# Patient Record
Sex: FEMALE | Race: WHITE | NOT HISPANIC OR LATINO | Employment: UNEMPLOYED | URBAN - NONMETROPOLITAN AREA
[De-identification: names, ages, dates, MRNs, and addresses within clinical notes are randomized per-mention and may not be internally consistent; named-entity substitution may affect disease eponyms.]

---

## 2017-11-22 ENCOUNTER — NON-PROVIDER VISIT (OUTPATIENT)
Dept: URGENT CARE | Facility: PHYSICIAN GROUP | Age: 47
End: 2017-11-22

## 2017-11-22 DIAGNOSIS — Z02.1 PRE-EMPLOYMENT DRUG SCREENING: ICD-10-CM

## 2017-11-22 LAB
AMP AMPHETAMINE: NORMAL
COC COCAINE: NORMAL
INT CON NEG: NEGATIVE
INT CON POS: POSITIVE
MET METHAMPHETAMINES: NORMAL
OPI OPIATES: NORMAL
PCP PHENCYCLIDINE: NORMAL
POC DRUG COMMENT 753798-OCCUPATIONAL HEALTH: NEGATIVE
THC: NORMAL

## 2017-11-22 PROCEDURE — 80305 DRUG TEST PRSMV DIR OPT OBS: CPT | Performed by: PHYSICIAN ASSISTANT

## 2018-01-14 ENCOUNTER — OFFICE VISIT (OUTPATIENT)
Dept: URGENT CARE | Facility: PHYSICIAN GROUP | Age: 48
End: 2018-01-14
Payer: MEDICAID

## 2018-01-14 VITALS
BODY MASS INDEX: 36.43 KG/M2 | HEIGHT: 69 IN | OXYGEN SATURATION: 95 % | HEART RATE: 104 BPM | SYSTOLIC BLOOD PRESSURE: 124 MMHG | RESPIRATION RATE: 16 BRPM | TEMPERATURE: 97.6 F | WEIGHT: 246 LBS | DIASTOLIC BLOOD PRESSURE: 80 MMHG

## 2018-01-14 DIAGNOSIS — M25.512 LEFT SHOULDER PAIN, UNSPECIFIED CHRONICITY: ICD-10-CM

## 2018-01-14 PROCEDURE — 99203 OFFICE O/P NEW LOW 30 MIN: CPT | Performed by: PHYSICIAN ASSISTANT

## 2018-01-14 RX ORDER — IBUPROFEN 800 MG/1
800 TABLET ORAL EVERY 8 HOURS PRN
Qty: 30 TAB | Refills: 0 | Status: SHIPPED | OUTPATIENT
Start: 2018-01-14 | End: 2018-05-10 | Stop reason: SDUPTHER

## 2018-01-14 NOTE — PROGRESS NOTES
"Chief Complaint   Patient presents with   • Shoulder Pain       HISTORY OF PRESENT ILLNESS: Patient is a 47 y.o. female who presents today for the following:    Left shoulder pain x 2 months  Worsening pain and range of motion  + mild hand pain x 2-3 days  Denies swelling, distal paresthesias, previous injury  OTC meds tried: APAP 1000mg every 6 hours, not helping  Xray some time in the last 2 months - \"frozen shoulder\"      There are no active problems to display for this patient.      Allergies:Darvon [propoxyphene] and Pcn [penicillins]    Current Outpatient Prescriptions Ordered in [facility]   Medication Sig Dispense Refill   • ibuprofen (MOTRIN) 800 MG Tab Take 1 Tab by mouth every 8 hours as needed for Moderate Pain. 30 Tab 0     No current Epic-ordered facility-administered medications on file.        No past medical history on file.    Social History   Substance Use Topics   • Smoking status: Never Smoker   • Smokeless tobacco: Never Used   • Alcohol use Not on file       No family status information on file.   No family history on file.    ROS:   Review of Systems   Constitutional: Negative for fever, chills, weight loss and malaise/fatigue.   HENT: Negative for ear pain, nosebleeds, congestion, sore throat and neck pain.    Eyes: Negative for blurred vision.   Respiratory: Negative for cough, sputum production, shortness of breath and wheezing.    Cardiovascular: Negative for chest pain, palpitations, orthopnea and leg swelling.   Gastrointestinal: Negative for heartburn, nausea, vomiting and abdominal pain.   Genitourinary: Negative for dysuria, urgency and frequency.       Exam:  Blood pressure 124/80, pulse (!) 104, temperature 36.4 °C (97.6 °F), resp. rate 16, height 1.753 m (5' 9\"), weight 111.6 kg (246 lb), SpO2 95 %.  General: Well developed, well nourished. No distress.  HEENT: Head is grossly normal.  Pulmonary: No respiratory distress noted.  Cardiovascular: Radial pulses are strong and equal " bilaterally.  Extremities: Significant decreased range of motion in all planes of the left shoulder. No soft tissue swelling, erythema, or rashes noted in the area of pain. Generalized tenderness noted of the distal clavicle and left shoulder. No other abnormalities noted on the left arm or hand.  strength of the left hand is slightly weaker than the right.  Psych: Normal mood. Alert and oriented x3. Judgment and insight is normal.    Assessment/Plan:  Recommended adding ibuprofen to the acetaminophen for additional pain relief. Discussed using over-the-counter muscle resident and heat. Establish and follow up with her primary care provider. Referred to physical therapy.   1. Left shoulder pain, unspecified chronicity  REFERRAL TO PHYSICAL THERAPY Reason for Therapy: Eval/Treat/Report    ibuprofen (MOTRIN) 800 MG Tab

## 2018-01-24 ENCOUNTER — HOSPITAL ENCOUNTER (OUTPATIENT)
Facility: MEDICAL CENTER | Age: 48
End: 2018-01-24
Attending: PHYSICIAN ASSISTANT
Payer: MEDICAID

## 2018-01-24 ENCOUNTER — OFFICE VISIT (OUTPATIENT)
Dept: URGENT CARE | Facility: PHYSICIAN GROUP | Age: 48
End: 2018-01-24
Payer: MEDICAID

## 2018-01-24 VITALS
RESPIRATION RATE: 16 BRPM | TEMPERATURE: 98.2 F | HEIGHT: 68 IN | BODY MASS INDEX: 37.44 KG/M2 | WEIGHT: 247 LBS | OXYGEN SATURATION: 98 % | SYSTOLIC BLOOD PRESSURE: 128 MMHG | HEART RATE: 78 BPM | DIASTOLIC BLOOD PRESSURE: 84 MMHG

## 2018-01-24 DIAGNOSIS — J31.0 OTHER RHINITIS, UNSPECIFIED CHRONICITY: ICD-10-CM

## 2018-01-24 DIAGNOSIS — R82.90 ABNORMAL URINALYSIS: ICD-10-CM

## 2018-01-24 DIAGNOSIS — M25.512 ACUTE PAIN OF LEFT SHOULDER: ICD-10-CM

## 2018-01-24 DIAGNOSIS — N30.01 ACUTE CYSTITIS WITH HEMATURIA: ICD-10-CM

## 2018-01-24 DIAGNOSIS — H65.93 FLUID LEVEL BEHIND TYMPANIC MEMBRANE OF BOTH EARS: ICD-10-CM

## 2018-01-24 DIAGNOSIS — R39.15 URINARY URGENCY: ICD-10-CM

## 2018-01-24 DIAGNOSIS — R42 VERTIGO: ICD-10-CM

## 2018-01-24 DIAGNOSIS — E66.9 OBESITY (BMI 35.0-39.9 WITHOUT COMORBIDITY): ICD-10-CM

## 2018-01-24 DIAGNOSIS — R35.0 URINARY FREQUENCY: ICD-10-CM

## 2018-01-24 LAB
APPEARANCE UR: NORMAL
BILIRUB UR STRIP-MCNC: NORMAL MG/DL
COLOR UR AUTO: YELLOW
GLUCOSE BLD-MCNC: 101 MG/DL (ref 70–100)
GLUCOSE UR STRIP.AUTO-MCNC: NORMAL MG/DL
KETONES UR STRIP.AUTO-MCNC: NORMAL MG/DL
LEUKOCYTE ESTERASE UR QL STRIP.AUTO: NORMAL
NITRITE UR QL STRIP.AUTO: NORMAL
PH UR STRIP.AUTO: 5 [PH] (ref 5–8)
PROT UR QL STRIP: NORMAL MG/DL
RBC UR QL AUTO: NORMAL
SP GR UR STRIP.AUTO: 1.02
UROBILINOGEN UR STRIP-MCNC: NORMAL MG/DL

## 2018-01-24 PROCEDURE — 81002 URINALYSIS NONAUTO W/O SCOPE: CPT | Performed by: PHYSICIAN ASSISTANT

## 2018-01-24 PROCEDURE — 87077 CULTURE AEROBIC IDENTIFY: CPT

## 2018-01-24 PROCEDURE — 87086 URINE CULTURE/COLONY COUNT: CPT

## 2018-01-24 PROCEDURE — 82962 GLUCOSE BLOOD TEST: CPT | Performed by: PHYSICIAN ASSISTANT

## 2018-01-24 PROCEDURE — 99214 OFFICE O/P EST MOD 30 MIN: CPT | Mod: 25 | Performed by: PHYSICIAN ASSISTANT

## 2018-01-24 PROCEDURE — 87186 SC STD MICRODIL/AGAR DIL: CPT

## 2018-01-24 RX ORDER — FLUTICASONE PROPIONATE 50 MCG
1 SPRAY, SUSPENSION (ML) NASAL 2 TIMES DAILY
Qty: 1 BOTTLE | Refills: 0 | Status: SHIPPED | OUTPATIENT
Start: 2018-01-24 | End: 2019-04-01

## 2018-01-24 RX ORDER — NITROFURANTOIN 25; 75 MG/1; MG/1
100 CAPSULE ORAL EVERY 12 HOURS
Qty: 10 CAP | Refills: 0 | Status: SHIPPED | OUTPATIENT
Start: 2018-01-24 | End: 2018-01-29

## 2018-01-24 NOTE — PROGRESS NOTES
"Chief Complaint   Patient presents with   • Arm Pain     Seen twice for same issues; [facility]; left arm/shoulder       HISTORY OF PRESENT ILLNESS: Patient is a 47 y.o. female who presents today for the following:    Left shoulder pain x 2 months  Had first PT appointment today  Xray some time in the last 2 months - \"frozen shoulder\"   Continues alternating ibuprofen/APAP with some relief in pain    New onset vertigo x yesteday  4 episode yesterday: 1 supine, 1 sitting, 2 standing  1 episode today while sitting  Describes room spinning  Not worse with head movement/position change  Denies recent URI  H/o tubal ligation  Denies: tob/EtOH/illicit drug use; exogenous hormones; h/o DM, HTN, CVA/MI.  Mom with h/o DM, ?CVA    Patient Active Problem List    Diagnosis Date Noted   • Obesity (BMI 35.0-39.9 without comorbidity) ([facility]) 01/24/2018       Allergies:Darvon [propoxyphene] and Pcn [penicillins]    Current Outpatient Prescriptions Ordered in [facility]   Medication Sig Dispense Refill   • fluticasone (FLONASE) 50 MCG/ACT nasal spray Spray 1 Spray in nose 2 times a day. 1 Bottle 0   • nitrofurantoin monohydr macro (MACROBID) 100 MG Cap Take 1 Cap by mouth every 12 hours for 5 days. 10 Cap 0   • ibuprofen (MOTRIN) 800 MG Tab Take 1 Tab by mouth every 8 hours as needed for Moderate Pain. 30 Tab 0     No current Epic-ordered facility-administered medications on file.        History reviewed. No pertinent past medical history.    Social History   Substance Use Topics   • Smoking status: Never Smoker   • Smokeless tobacco: Never Used   • Alcohol use No       No family status information on file.   History reviewed. No pertinent family history.    ROS:   Review of Systems   Constitutional: Negative for fever, chills, weight loss and malaise/fatigue.   HENT: Negative for ear pain, nosebleeds, congestion, sore throat and neck pain.    Eyes: Negative for blurred vision.   Respiratory: Negative for cough, sputum production, shortness of " "breath and wheezing.    Cardiovascular: Negative for chest pain, palpitations, orthopnea and leg swelling.   Gastrointestinal: Negative for heartburn, nausea, vomiting and abdominal pain.   Genitourinary: Positive for urinary urgency and frequency.       Exam:  Blood pressure 128/84, pulse 78, temperature 36.8 °C (98.2 °F), resp. rate 16, height 1.727 m (5' 8\"), weight 112 kg (247 lb), SpO2 98 %.  General: Well developed, well nourished. No distress.  HEENT: Conjunctiva clear, lids without ptosis, PERRL/EOMI. Ears normal shape and contour, canals are clear bilaterally, tympanic membranes are benign but with clear fluid posteriorly bilaterally. Nasal mucosa edematous bilaterally. Oropharynx is without erythema, edema or exudates. MMM.  Neck: No carotid bruits noted.  Pulmonary: Clear to ausculation and percussion.  Normal effort. No rales, ronchi, or wheezing.   Cardiovascular: Regular rate and rhythm without murmur. No edema.   Neurologic: Grossly nonfocal.  Lymph: No cervical lymphadenopathy noted.  Skin: Warm, dry, good turgor. No rashes in visible areas.   Psych: Normal mood. Alert and oriented x3. Judgment and insight is normal.    UA: + nitrates, large blood    POCT glucose: 101    Assessment/Plan:  Take all medication as directed. Will contact patient with culture results. Follow up for worsening or persistent symptoms.  1. Vertigo  POCT Urinalysis    POCT glucose    ? due to inner ear issue. Start Flonase. Follow up for worsening or persistent symptoms.   2. Acute pain of left shoulder      Continue PT. Will refer to ortho.   3. Obesity (BMI 35.0-39.9 without comorbidity)  Patient identified as having weight management issue.  Appropriate orders and counseling given.   4. Other rhinitis, unspecified chronicity  fluticasone (FLONASE) 50 MCG/ACT nasal spray    Start Flonase.   5. Fluid level behind tympanic membrane of both ears  fluticasone (FLONASE) 50 MCG/ACT nasal spray    Start Flonase.   6. Abnormal " urinalysis  URINE CULTURE(NEW)   7. Acute cystitis with hematuria  nitrofurantoin monohydr macro (MACROBID) 100 MG Cap   8. Urinary frequency  nitrofurantoin monohydr macro (MACROBID) 100 MG Cap   9. Urinary urgency  nitrofurantoin monohydr macro (MACROBID) 100 MG Cap

## 2018-01-26 LAB
BACTERIA UR CULT: ABNORMAL
BACTERIA UR CULT: ABNORMAL
SIGNIFICANT IND 70042: ABNORMAL
SITE SITE: ABNORMAL
SOURCE SOURCE: ABNORMAL

## 2018-01-29 ENCOUNTER — TELEPHONE (OUTPATIENT)
Dept: URGENT CARE | Facility: PHYSICIAN GROUP | Age: 48
End: 2018-01-29

## 2018-01-29 NOTE — TELEPHONE ENCOUNTER
----- Message from Elizabeth Moctezuma P.A.-C. sent at 1/29/2018 11:56 AM PST -----  Please let pt know the urine culture is positive for infection. Patient was treated appropriately during the visit. Follow up for persistent symptoms.

## 2018-03-15 ENCOUNTER — NON-PROVIDER VISIT (OUTPATIENT)
Dept: URGENT CARE | Facility: PHYSICIAN GROUP | Age: 48
End: 2018-03-15

## 2018-03-15 PROCEDURE — 8907 PR URINE COLLECT ONLY: Performed by: PHYSICIAN ASSISTANT

## 2018-04-25 ENCOUNTER — OFFICE VISIT (OUTPATIENT)
Dept: URGENT CARE | Facility: CLINIC | Age: 48
End: 2018-04-25

## 2018-04-25 VITALS
TEMPERATURE: 97.7 F | OXYGEN SATURATION: 94 % | DIASTOLIC BLOOD PRESSURE: 72 MMHG | HEART RATE: 102 BPM | BODY MASS INDEX: 37.44 KG/M2 | WEIGHT: 247 LBS | RESPIRATION RATE: 14 BRPM | SYSTOLIC BLOOD PRESSURE: 114 MMHG | HEIGHT: 68 IN

## 2018-04-25 DIAGNOSIS — R07.9 CHEST PAIN, UNSPECIFIED TYPE: ICD-10-CM

## 2018-04-25 PROCEDURE — 99204 OFFICE O/P NEW MOD 45 MIN: CPT | Performed by: PHYSICIAN ASSISTANT

## 2018-04-25 ASSESSMENT — ENCOUNTER SYMPTOMS
LOWER EXTREMITY EDEMA: 0
LEG PAIN: 0
EXERTIONAL CHEST PRESSURE: 1
NAUSEA: 1
IRREGULAR HEARTBEAT: 0
DIZZINESS: 1
PALPITATIONS: 0
VOMITING: 0
ORTHOPNEA: 0
SYNCOPE: 0

## 2018-05-10 ENCOUNTER — OFFICE VISIT (OUTPATIENT)
Dept: MEDICAL GROUP | Facility: CLINIC | Age: 48
End: 2018-05-10
Payer: MEDICAID

## 2018-05-10 VITALS
HEIGHT: 68 IN | SYSTOLIC BLOOD PRESSURE: 122 MMHG | BODY MASS INDEX: 37.44 KG/M2 | OXYGEN SATURATION: 91 % | DIASTOLIC BLOOD PRESSURE: 70 MMHG | WEIGHT: 247 LBS | RESPIRATION RATE: 16 BRPM | TEMPERATURE: 97.4 F | HEART RATE: 88 BPM

## 2018-05-10 DIAGNOSIS — Z91.030 BEE STING ALLERGY: ICD-10-CM

## 2018-05-10 DIAGNOSIS — R42 VERTIGO: ICD-10-CM

## 2018-05-10 DIAGNOSIS — M75.02 ADHESIVE CAPSULITIS OF LEFT SHOULDER: ICD-10-CM

## 2018-05-10 PROBLEM — J30.9 ALLERGIC RHINITIS: Status: ACTIVE | Noted: 2018-05-10

## 2018-05-10 PROCEDURE — 99215 OFFICE O/P EST HI 40 MIN: CPT | Mod: 25 | Performed by: FAMILY MEDICINE

## 2018-05-10 PROCEDURE — 20610 DRAIN/INJ JOINT/BURSA W/O US: CPT | Performed by: FAMILY MEDICINE

## 2018-05-10 RX ORDER — IBUPROFEN 800 MG/1
800 TABLET ORAL EVERY 8 HOURS PRN
Qty: 30 TAB | Refills: 0 | Status: SHIPPED | OUTPATIENT
Start: 2018-05-10 | End: 2019-04-01

## 2018-05-10 RX ORDER — MULTIVITAMIN WITH IRON
TABLET ORAL
COMMUNITY

## 2018-05-10 RX ORDER — EPINEPHRINE 0.3 MG/.3ML
0.3 INJECTION SUBCUTANEOUS ONCE
Qty: 0.3 ML | Refills: 0 | Status: SHIPPED | OUTPATIENT
Start: 2018-05-10 | End: 2018-05-10

## 2018-05-10 RX ORDER — MECLIZINE HYDROCHLORIDE 25 MG/1
25 TABLET ORAL 3 TIMES DAILY PRN
Qty: 30 TAB | Refills: 0 | Status: SHIPPED | OUTPATIENT
Start: 2018-05-10

## 2018-05-10 RX ORDER — MECLIZINE HYDROCHLORIDE 25 MG/1
25 TABLET ORAL 3 TIMES DAILY PRN
COMMUNITY
End: 2018-05-10 | Stop reason: SDUPTHER

## 2018-05-10 ASSESSMENT — PATIENT HEALTH QUESTIONNAIRE - PHQ9
CLINICAL INTERPRETATION OF PHQ2 SCORE: 2
SUM OF ALL RESPONSES TO PHQ QUESTIONS 1-9: 7
5. POOR APPETITE OR OVEREATING: 2 - MORE THAN HALF THE DAYS

## 2018-05-10 NOTE — ASSESSMENT & PLAN NOTE
Started having pain in left shoulder in November, as gradually gotten worse the point that she cannot lift her arm reach behind her head or lower back anymore. The longer sleeping on the left side due to pain. Taking ibuprofen 800 mg up 3 times a day. Denies any injury.

## 2018-05-10 NOTE — ASSESSMENT & PLAN NOTE
Had a bout of vertigo while at work at [employer]. Taken to the emergency room. Had full evaluation. Sent home on meclizine. No more bouts. Still taking twice a day. Reviewed the ER notes for [facility]

## 2018-05-10 NOTE — PROGRESS NOTES
Complaint: Follow-up ER and left frozen shoulder     Subjective:     Luisa Hernandez is a 47 y.o. female here today for follow-up from a urinary visit at [facility] on 4/25/2018.    Adhesive capsulitis of left shoulder  Started having pain in left shoulder in November, as gradually gotten worse the point that she cannot lift her arm reach behind her head or lower back anymore. The longer sleeping on the left side due to pain. Taking ibuprofen 800 mg up 3 times a day. Denies any injury.    Vertigo  Had a bout of vertigo while at work at [employer]. Taken to the emergency room. Had full evaluation. Sent home on meclizine. No more bouts. Still taking twice a day. Reviewed the ER notes for [facility]     Requesting EpiPen for her bee sting allergy.    Current medicines (including changes today)  Current Outpatient Prescriptions   Medication Sig Dispense Refill   • Magnesium 250 MG Tab Take  by mouth.     • Doxylamine Succinate, Sleep, (SLEEP AID PO) Take  by mouth.     • meclizine (ANTIVERT) 25 MG Tab Take 1 Tab by mouth 3 times a day as needed. 30 Tab 0   • ibuprofen (MOTRIN) 800 MG Tab Take 1 Tab by mouth every 8 hours as needed for Moderate Pain. 30 Tab 0   • EPINEPHrine (EPIPEN) 0.3 MG/0.3ML Solution Auto-injector solution for injection 0.3 mL by Intramuscular route Once for 1 dose. 0.3 mL 0   • fluticasone (FLONASE) 50 MCG/ACT nasal spray Spray 1 Spray in nose 2 times a day. 1 Bottle 0     No current facility-administered medications for this visit.      She  has no past medical history on file.    Health Maintenance: Has had a Pap smear and mammogram in the last year. Declined Tdap today.      Allergies: Contrast media with iodine [iodine]; Darvon [propoxyphene]; and Pcn [penicillins]    Current Outpatient Prescriptions Ordered in [facility]   Medication Sig Dispense Refill   • Magnesium 250 MG Tab Take  by mouth.     • Doxylamine Succinate, Sleep, (SLEEP AID PO) Take  by mouth.     •  "meclizine (ANTIVERT) 25 MG Tab Take 1 Tab by mouth 3 times a day as needed. 30 Tab 0   • ibuprofen (MOTRIN) 800 MG Tab Take 1 Tab by mouth every 8 hours as needed for Moderate Pain. 30 Tab 0   • EPINEPHrine (EPIPEN) 0.3 MG/0.3ML Solution Auto-injector solution for injection 0.3 mL by Intramuscular route Once for 1 dose. 0.3 mL 0   • fluticasone (FLONASE) 50 MCG/ACT nasal spray Spray 1 Spray in nose 2 times a day. 1 Bottle 0     No current Epic-ordered facility-administered medications on file.        History reviewed. No pertinent past medical history.    Past Surgical History:   Procedure Laterality Date   • APPENDECTOMY     • CHOLECYSTECTOMY     • KNEE ARTHROSCOPY Left    • TUBAL COAGULATION LAPAROSCOPIC BILATERAL         Social History   Substance Use Topics   • Smoking status: Never Smoker   • Smokeless tobacco: Never Used   • Alcohol use No       Social History     Social History Narrative   • No narrative on file       History reviewed. No pertinent family history.      ROS  Patient denies any fever, chills, unintentional weight gain/loss, fatigue, stroke symptoms, dizziness, headache, nasal congestion, sore-throat, cough, heartburn, chest pain, difficulty breathing, abdominal discomfort, diarrhea/constipation, burning with urination or frequency, back pain, skin rashes, depression or anxiety.       Objective:     Blood pressure 122/70, pulse 88, temperature 36.3 °C (97.4 °F), resp. rate 16, height 1.727 m (5' 8\"), weight 112 kg (247 lb), SpO2 91 %. Body mass index is 37.56 kg/m².   Physical Exam:  Constitutional: Alert, no distress.  Skin: Warm, dry, good turgor, no rashes in visible areas.  Eye: Equal, round and reactive, conjunctiva clear, lids normal.  ENMT: Lips without lesions, good dentition, oropharynx clear.  Neck: Trachea midline, no masses, no thyromegaly. No cervical or supraclavicular lymphadenopathy  Respiratory: Unlabored respiratory effort, lungs clear to auscultation, no wheezes, no " simona.  Cardiovascular: Normal S1, S2, no murmur, no extremity edema.  Abdomen: Soft, non-tender, no masses, no hepatosplenomegaly.  Left shoulder: Patient unable to flex abduct over 30°. Tenderness around the rotator cuff as well as insertion of the super spinatus tendon.  Psych: Alert and oriented x3, appropriate affect and mood.        Assessment and Plan:   The following treatment plan was discussed    1. Adhesive capsulitis of left shoulder  New problem.  - ibuprofen (MOTRIN) 800 MG Tab; Take 1 Tab by mouth every 8 hours as needed for Moderate Pain.  Dispense: 30 Tab; Refill: 0    Discussed options. Patient agreeable to trial of steroid. Reviewed procedure. Patient consents verbally. Skin over lateral aspect below the acromion is disinfected with alcohol. Infiltration 3 cc of a mixture of 1% Xylocaine/0.25% Marcaine/Kenalog 40 mg in and around his subacromial bursa as well as the tendon. After 5 minutes attempted to release the adhesions was unable to. Minimal relief in pain noted.    - REFERRAL TO ORTHOPEDICS  - MR-SHOULDER-W/O LEFT; Future    2. Vertigo  Chronic problem. Patient told to back off on taking her meclizine. Make sure she is well hydrated all times. Explained possible inner ear origin of her symptoms.  - meclizine (ANTIVERT) 25 MG Tab; Take 1 Tab by mouth 3 times a day as needed.  Dispense: 30 Tab; Refill: 0    3. Bee sting allergy  EpiPen ordered.  - EPINEPHrine (EPIPEN) 0.3 MG/0.3ML Solution Auto-injector solution for injection; 0.3 mL by Intramuscular route Once for 1 dose.  Dispense: 0.3 mL; Refill: 0      Followup: Return if symptoms worsen or fail to improve.    Please note that this dictation was created using voice recognition software. I have made every reasonable attempt to correct obvious errors, but I expect that there are errors of grammar and possibly content that I did not discover before finalizing the note.

## 2019-03-05 ENCOUNTER — OFFICE VISIT (OUTPATIENT)
Dept: URGENT CARE | Facility: PHYSICIAN GROUP | Age: 49
End: 2019-03-05
Payer: COMMERCIAL

## 2019-03-05 VITALS
RESPIRATION RATE: 16 BRPM | TEMPERATURE: 98.1 F | HEART RATE: 88 BPM | BODY MASS INDEX: 39.25 KG/M2 | WEIGHT: 259 LBS | OXYGEN SATURATION: 95 % | DIASTOLIC BLOOD PRESSURE: 72 MMHG | SYSTOLIC BLOOD PRESSURE: 104 MMHG | HEIGHT: 68 IN

## 2019-03-05 DIAGNOSIS — R10.9 FLANK PAIN: ICD-10-CM

## 2019-03-05 DIAGNOSIS — G89.29 CHRONIC BILATERAL LOW BACK PAIN WITHOUT SCIATICA: ICD-10-CM

## 2019-03-05 DIAGNOSIS — M54.50 CHRONIC BILATERAL LOW BACK PAIN WITHOUT SCIATICA: ICD-10-CM

## 2019-03-05 DIAGNOSIS — N28.1 KIDNEY CYSTS: ICD-10-CM

## 2019-03-05 LAB
APPEARANCE UR: CLEAR
BILIRUB UR STRIP-MCNC: NEGATIVE MG/DL
COLOR UR AUTO: YELLOW
GLUCOSE UR STRIP.AUTO-MCNC: NEGATIVE MG/DL
KETONES UR STRIP.AUTO-MCNC: NEGATIVE MG/DL
LEUKOCYTE ESTERASE UR QL STRIP.AUTO: NEGATIVE
NITRITE UR QL STRIP.AUTO: NEGATIVE
PH UR STRIP.AUTO: 6 [PH] (ref 5–8)
PROT UR QL STRIP: NEGATIVE MG/DL
RBC UR QL AUTO: NORMAL
SP GR UR STRIP.AUTO: 1.02
UROBILINOGEN UR STRIP-MCNC: 0.2 MG/DL

## 2019-03-05 PROCEDURE — 81002 URINALYSIS NONAUTO W/O SCOPE: CPT | Performed by: NURSE PRACTITIONER

## 2019-03-05 PROCEDURE — 99214 OFFICE O/P EST MOD 30 MIN: CPT | Mod: 25 | Performed by: NURSE PRACTITIONER

## 2019-03-05 RX ORDER — CYCLOBENZAPRINE HCL 10 MG
10 TABLET ORAL
Qty: 15 TAB | Refills: 0 | Status: SHIPPED | OUTPATIENT
Start: 2019-03-05 | End: 2019-03-10

## 2019-03-05 RX ORDER — METHYLPREDNISOLONE 4 MG/1
4 TABLET ORAL DAILY
Qty: 1 KIT | Refills: 0 | Status: SHIPPED | OUTPATIENT
Start: 2019-03-05 | End: 2019-04-01

## 2019-03-05 ASSESSMENT — ENCOUNTER SYMPTOMS
FLANK PAIN: 1
MYALGIAS: 0
EYES NEGATIVE: 1
CARDIOVASCULAR NEGATIVE: 1
CHILLS: 0
NECK PAIN: 0
DIZZINESS: 0
NAUSEA: 0
DIARRHEA: 0
VOMITING: 0
WHEEZING: 0
BACK PAIN: 1
FEVER: 0
NEUROLOGICAL NEGATIVE: 1
CONSTIPATION: 0
RESPIRATORY NEGATIVE: 1
SHORTNESS OF BREATH: 0
ABDOMINAL PAIN: 0
HEADACHES: 0

## 2019-03-05 NOTE — PROGRESS NOTES
Subjective:   Luisa Hernandez is a 48 y.o. female who presents for Rib Pain (R side/ pt states if she is leaning forward it hurts her/ non injury related)        HPI   Patient with new onset right flank pain that started a few days ago. Symptoms are waxing and waning in nature. States pain is moderate and worsened with movement, especially bending forward. Has not tried anything for relief. Denies alleviating  factors.  Denies recent strain or trauma to the area. Denies urinary symptoms.  Patient with history of lower back pain, had gallbladder, appendix and tubal ligation prior surgeries.    Review of Systems   Constitutional: Negative for chills and fever.   Eyes: Negative.    Respiratory: Negative.  Negative for shortness of breath and wheezing.    Cardiovascular: Negative.  Negative for chest pain.   Gastrointestinal: Negative for abdominal pain, constipation, diarrhea, nausea and vomiting.   Genitourinary: Positive for flank pain. Negative for dysuria, frequency, hematuria and urgency.   Musculoskeletal: Positive for back pain. Negative for myalgias and neck pain.   Skin: Negative.    Neurological: Negative.  Negative for dizziness and headaches.     PMH:  has no past medical history on file.  MEDS:   Current Outpatient Prescriptions:   •  MethylPREDNISolone (MEDROL DOSEPAK) 4 MG Tablet Therapy Pack, Take 1 Tab by mouth every day., Disp: 1 Kit, Rfl: 0  •  cyclobenzaprine (FLEXERIL) 10 MG Tab, Take 1 Tab by mouth at bedtime as needed for Muscle Spasms for up to 5 days., Disp: 15 Tab, Rfl: 0  •  Magnesium 250 MG Tab, Take  by mouth., Disp: , Rfl:   •  Doxylamine Succinate, Sleep, (SLEEP AID PO), Take  by mouth., Disp: , Rfl:   •  meclizine (ANTIVERT) 25 MG Tab, Take 1 Tab by mouth 3 times a day as needed., Disp: 30 Tab, Rfl: 0  •  ibuprofen (MOTRIN) 800 MG Tab, Take 1 Tab by mouth every 8 hours as needed for Moderate Pain., Disp: 30 Tab, Rfl: 0  •  fluticasone (FLONASE) 50 MCG/ACT nasal spray, Spray 1 Spray in nose  "2 times a day., Disp: 1 Bottle, Rfl: 0  ALLERGIES:   Allergies   Allergen Reactions   • Contrast Media With Iodine [Iodine]    • Darvon [Propoxyphene]    • Pcn [Penicillins]      SURGHX:   Past Surgical History:   Procedure Laterality Date   • APPENDECTOMY     • CHOLECYSTECTOMY     • KNEE ARTHROSCOPY Left    • TUBAL COAGULATION LAPAROSCOPIC BILATERAL       SOCHX:  reports that she has never smoked. She has never used smokeless tobacco. She reports that she does not drink alcohol or use drugs.  FH: Family history was reviewed, no pertinent findings to report     Objective:   /72   Pulse 88   Temp 36.7 °C (98.1 °F) (Temporal)   Resp 16   Ht 1.727 m (5' 8\")   Wt 117.5 kg (259 lb)   SpO2 95%   BMI 39.38 kg/m²   Physical Exam   Constitutional: She is oriented to person, place, and time. She appears well-developed and well-nourished. No distress.   HENT:   Right Ear: Hearing normal.   Left Ear: Hearing normal.   Mouth/Throat: Oropharynx is clear and moist and mucous membranes are normal.   Eyes: Pupils are equal, round, and reactive to light. Conjunctivae are normal.   Cardiovascular: Normal rate, regular rhythm and normal heart sounds.    No murmur heard.  Pulmonary/Chest: Effort normal and breath sounds normal. No respiratory distress.   Abdominal: Soft. Normal appearance and bowel sounds are normal. She exhibits no distension. There is tenderness in the right upper quadrant. There is no CVA tenderness.       Musculoskeletal:        Back:    Bilateral upper extremities ROM normal and equal. Muscular strength 5/5.   Neurological: She is alert and oriented to person, place, and time.   Skin: Skin is warm and dry. Capillary refill takes less than 2 seconds. No rash noted. She is not diaphoretic.   No abrasion or area of bruising noted.   Psychiatric: She has a normal mood and affect. Her behavior is normal. Judgment and thought content normal.   Vitals reviewed.        Assessment/Plan:   Assessment    1. Flank " pain  - POCT Urinalysis  - CT-RENAL COLIC EVALUATION(A/P W/O); Future  - MethylPREDNISolone (MEDROL DOSEPAK) 4 MG Tablet Therapy Pack; Take 1 Tab by mouth every day.  Dispense: 1 Kit; Refill: 0  - cyclobenzaprine (FLEXERIL) 10 MG Tab; Take 1 Tab by mouth at bedtime as needed for Muscle Spasms for up to 5 days.  Dispense: 15 Tab; Refill: 0    2. Chronic bilateral low back pain without sciatica  - POCT Urinalysis  - MethylPREDNISolone (MEDROL DOSEPAK) 4 MG Tablet Therapy Pack; Take 1 Tab by mouth every day.  Dispense: 1 Kit; Refill: 0  - cyclobenzaprine (FLEXERIL) 10 MG Tab; Take 1 Tab by mouth at bedtime as needed for Muscle Spasms for up to 5 days.  Dispense: 15 Tab; Refill: 0    3. Kidney cysts  - REFERRAL TO NEPHROLOGY    UA with moderate blood.  Xray findings with left kidney cyst 7mm. No renal stones seen.  Referral to Nephrology regarding cyst for follow up    Apply heat to affected area, 10 minutes at a time, PRN pain    Discussed signs and symptoms of when to go to ER    Differential diagnosis, natural history, supportive care, and indications for immediate follow-up discussed.

## 2019-03-08 ENCOUNTER — OFFICE VISIT (OUTPATIENT)
Dept: URGENT CARE | Facility: PHYSICIAN GROUP | Age: 49
End: 2019-03-08
Payer: COMMERCIAL

## 2019-03-08 ENCOUNTER — APPOINTMENT (OUTPATIENT)
Dept: RADIOLOGY | Facility: IMAGING CENTER | Age: 49
End: 2019-03-08
Attending: PHYSICIAN ASSISTANT
Payer: COMMERCIAL

## 2019-03-08 VITALS
HEART RATE: 79 BPM | BODY MASS INDEX: 36.8 KG/M2 | DIASTOLIC BLOOD PRESSURE: 80 MMHG | SYSTOLIC BLOOD PRESSURE: 128 MMHG | OXYGEN SATURATION: 94 % | WEIGHT: 242 LBS | RESPIRATION RATE: 16 BRPM | TEMPERATURE: 98 F

## 2019-03-08 DIAGNOSIS — R10.9 RIGHT FLANK PAIN: ICD-10-CM

## 2019-03-08 DIAGNOSIS — R06.02 SOB (SHORTNESS OF BREATH): ICD-10-CM

## 2019-03-08 DIAGNOSIS — R07.81 RIB PAIN: ICD-10-CM

## 2019-03-08 PROCEDURE — 71046 X-RAY EXAM CHEST 2 VIEWS: CPT | Mod: TC,FY | Performed by: PHYSICIAN ASSISTANT

## 2019-03-08 PROCEDURE — 99214 OFFICE O/P EST MOD 30 MIN: CPT | Performed by: PHYSICIAN ASSISTANT

## 2019-03-08 RX ORDER — METHOCARBAMOL 750 MG/1
750 TABLET, FILM COATED ORAL 4 TIMES DAILY PRN
Qty: 30 TAB | Refills: 0 | Status: SHIPPED | OUTPATIENT
Start: 2019-03-08 | End: 2019-03-17 | Stop reason: SDUPTHER

## 2019-03-08 NOTE — PROGRESS NOTES
Chief Complaint   Patient presents with   • Shortness of Breath   • Flank Pain     Right side x 4 days   patinet had CT performed 03/05/2019       HISTORY OF PRESENT ILLNESS: Patient is a 48 y.o. female who presents today for the following:    Right side rib pain, midaxillary, radiates to back, thoracic region  Denies injury; worse with movement, deep inspiration; SOB, worse with movement  Previous abdominal surgeries: mode, appy, tubal ligation  Denies N/V/D, fever, h/o renal stones  LMP: about a month ago  Never smoked  8/10 pain 2 days ago when seen in clinic; 10/10 pain currently  Worse at night due to SOB and pain  Was put on medrol dose pack and flexeril - no change; symptoms are actually worse     Patient Active Problem List    Diagnosis Date Noted   • Adhesive capsulitis of left shoulder 05/10/2018   • Allergic rhinitis 05/10/2018   • Vertigo 05/10/2018   • Bee sting allergy 05/10/2018   • Obesity (BMI 35.0-39.9 without comorbidity) ([facility]) 01/24/2018       Allergies:Contrast media with iodine [iodine]; Darvon [propoxyphene]; and Pcn [penicillins]    Current Outpatient Prescriptions Ordered in [facility]   Medication Sig Dispense Refill   • methocarbamol (ROBAXIN-750) 750 MG Tab Take 1 Tab by mouth 4 times a day as needed (pain). 30 Tab 0   • MethylPREDNISolone (MEDROL DOSEPAK) 4 MG Tablet Therapy Pack Take 1 Tab by mouth every day. 1 Kit 0   • cyclobenzaprine (FLEXERIL) 10 MG Tab Take 1 Tab by mouth at bedtime as needed for Muscle Spasms for up to 5 days. 15 Tab 0   • Magnesium 250 MG Tab Take  by mouth.     • Doxylamine Succinate, Sleep, (SLEEP AID PO) Take  by mouth.     • ibuprofen (MOTRIN) 800 MG Tab Take 1 Tab by mouth every 8 hours as needed for Moderate Pain. 30 Tab 0   • meclizine (ANTIVERT) 25 MG Tab Take 1 Tab by mouth 3 times a day as needed. (Patient not taking: Reported on 3/8/2019) 30 Tab 0   • fluticasone (FLONASE) 50 MCG/ACT nasal spray Spray 1 Spray in nose 2 times a day. (Patient not taking:  Reported on 3/8/2019) 1 Bottle 0     No current Epic-ordered facility-administered medications on file.        No past medical history on file.    Social History   Substance Use Topics   • Smoking status: Never Smoker   • Smokeless tobacco: Never Used   • Alcohol use No       No family status information on file.   No family history on file.    Review of Systems:   Constitutional ROS: No unexpected change in weight, No weakness, No fatigue  Eye ROS: No recent significant change in vision, No eye pain, redness, discharge  Ear ROS: No drainage, No tinnitus or vertigo, No recent change in hearing  Mouth/Throat ROS: No teeth or gum problems, No bleeding gums, No tongue complaints  Neck ROS: No swollen glands, No significant pain in neck  Pulmonary ROS: Positive for shortness of breath.  Cardiovascular ROS: No diaphoresis, No edema, No palpitations  Gastrointestinal ROS: No change in bowel habits, No significant change in appetite, No nausea, vomiting, diarrhea, or constipation  Musculoskeletal/Extremities ROS: No peripheral edema, No pain, redness or swelling on the joints  Hematologic/Lymphatic ROS: No chills, No night sweats, No weight loss  Skin/Integumentary ROS: No edema, No evidence of rash, No itching      Exam:  Blood pressure 128/80, pulse 79, temperature 36.7 °C (98 °F), temperature source Temporal, resp. rate 16, weight 109.8 kg (242 lb), SpO2 94 %.  General: Well developed, well nourished. No distress.  HEENT: Head is grossly normal.  Pulmonary: Unlabored respiratory effort. Lungs clear to auscultation, no wheezes, no rhonchi.  Cardiovascular: Regular rate and rhythm without murmur.   Trunk: Tenderness noted on the right sided ribs, mid axillary region, just under her bra strap extending along to the thoracic region.  No soft tissue swelling, erythema, ecchymosis, or rashes noted in the area of pain.  Neurologic: Grossly nonfocal. No facial asymmetry noted.  Skin: Warm, dry, good turgor. No rashes in visible  areas.   Psych: Normal mood. Alert and oriented x3. Judgment and insight is normal.    Chest x-ray, per radiology:  Impression       No active disease.     Assessment/Plan:  Patient was seen 3/5 for the same.  CT abdomen pelvis was negative for acute pathology.  Vitals are normal.  No relief with Medrol Dosepak and Flexeril.  Unknown etiology. ?  Muscular.  Will try different muscle relaxer.  Paperwork has been filled out for the patient for work.  Emphasized the importance of establishing and following up with a primary care provider for further evaluation and management of her symptoms.  1. SOB (shortness of breath)  DX-CHEST-2 VIEWS   2. Rib pain  methocarbamol (ROBAXIN-750) 750 MG Tab   3. Right flank pain  methocarbamol (ROBAXIN-750) 750 MG Tab

## 2019-03-17 ENCOUNTER — OFFICE VISIT (OUTPATIENT)
Dept: URGENT CARE | Facility: PHYSICIAN GROUP | Age: 49
End: 2019-03-17
Payer: COMMERCIAL

## 2019-03-17 VITALS
HEIGHT: 68 IN | TEMPERATURE: 97.7 F | OXYGEN SATURATION: 95 % | BODY MASS INDEX: 36.53 KG/M2 | WEIGHT: 241 LBS | DIASTOLIC BLOOD PRESSURE: 70 MMHG | SYSTOLIC BLOOD PRESSURE: 124 MMHG | RESPIRATION RATE: 14 BRPM | HEART RATE: 94 BPM

## 2019-03-17 DIAGNOSIS — R10.9 RIGHT FLANK PAIN: ICD-10-CM

## 2019-03-17 DIAGNOSIS — R07.81 RIB PAIN: ICD-10-CM

## 2019-03-17 PROCEDURE — 99214 OFFICE O/P EST MOD 30 MIN: CPT | Performed by: PHYSICIAN ASSISTANT

## 2019-03-17 RX ORDER — METHOCARBAMOL 750 MG/1
750 TABLET, FILM COATED ORAL 4 TIMES DAILY PRN
Qty: 30 TAB | Refills: 0 | Status: SHIPPED | OUTPATIENT
Start: 2019-03-17 | End: 2019-04-07 | Stop reason: SDUPTHER

## 2019-03-17 RX ORDER — GABAPENTIN 100 MG/1
100 CAPSULE ORAL 3 TIMES DAILY PRN
Qty: 30 CAP | Refills: 0 | Status: SHIPPED | OUTPATIENT
Start: 2019-03-17 | End: 2019-04-07 | Stop reason: SDUPTHER

## 2019-03-17 NOTE — PROGRESS NOTES
Chief Complaint   Patient presents with   • Back Pain     R side/ pain is getting worse       HISTORY OF PRESENT ILLNESS: Patient is a 48 y.o. female who presents today for the following:    Patient comes in for reevaluation of pain in her ribs and back.  This started approximately 3 weeks ago.  She has been seen twice for the same.  She has not had any improvement in symptoms with Flexeril or Medrol Dosepak but did have improvement with methocarbamol.  She has had a normal CT abdomen pelvis normal chest x-ray.  She denies urinary symptoms, fever, nausea, changes in bowels, night sweats, unexpected weight loss.  She has worsening pain with any pressure in the area and movement of the extremities or trunk.    Patient Active Problem List    Diagnosis Date Noted   • Adhesive capsulitis of left shoulder 05/10/2018   • Allergic rhinitis 05/10/2018   • Vertigo 05/10/2018   • Bee sting allergy 05/10/2018   • Obesity (BMI 35.0-39.9 without comorbidity) ([facility]) 01/24/2018       Allergies:Contrast media with iodine [iodine]; Darvon [propoxyphene]; and Pcn [penicillins]    Current Outpatient Prescriptions Ordered in [facility]   Medication Sig Dispense Refill   • gabapentin (NEURONTIN) 100 MG Cap Take 1 Cap by mouth 3 times a day as needed (pain). 30 Cap 0   • methocarbamol (ROBAXIN-750) 750 MG Tab Take 1 Tab by mouth 4 times a day as needed (pain). 30 Tab 0   • MethylPREDNISolone (MEDROL DOSEPAK) 4 MG Tablet Therapy Pack Take 1 Tab by mouth every day. 1 Kit 0   • Magnesium 250 MG Tab Take  by mouth.     • Doxylamine Succinate, Sleep, (SLEEP AID PO) Take  by mouth.     • meclizine (ANTIVERT) 25 MG Tab Take 1 Tab by mouth 3 times a day as needed. (Patient not taking: Reported on 3/8/2019) 30 Tab 0   • ibuprofen (MOTRIN) 800 MG Tab Take 1 Tab by mouth every 8 hours as needed for Moderate Pain. 30 Tab 0   • fluticasone (FLONASE) 50 MCG/ACT nasal spray Spray 1 Spray in nose 2 times a day. (Patient not taking: Reported on 3/8/2019) 1  "Bottle 0     No current [facility]-ordered facility-administered medications on file.        No past medical history on file.    Social History   Substance Use Topics   • Smoking status: Never Smoker   • Smokeless tobacco: Never Used   • Alcohol use No       No family status information on file.   No family history on file.    Review of Systems:   Constitutional ROS: No unexpected change in weight, No weakness, No fatigue  Eye ROS: No recent significant change in vision, No eye pain, redness, discharge  Ear ROS: No drainage, No tinnitus or vertigo, No recent change in hearing  Mouth/Throat ROS: No teeth or gum problems, No bleeding gums, No tongue complaints  Neck ROS: No swollen glands, No significant pain in neck  Pulmonary ROS: No chronic cough, sputum, or hemoptysis, No dyspnea on exertion, No wheezing  Cardiovascular ROS: No diaphoresis, No edema, No palpitations  Gastrointestinal ROS: No change in bowel habits, No significant change in appetite, No nausea, vomiting, diarrhea, or constipation  Musculoskeletal/Extremities ROS: Right-sided rib pain extending into the back.  Hematologic/Lymphatic ROS: No chills, No night sweats, No weight loss  Skin/Integumentary ROS: No edema, No evidence of rash, No itching      Exam:  Blood pressure 124/70, pulse 94, temperature 36.5 °C (97.7 °F), temperature source Temporal, resp. rate 14, height 1.727 m (5' 8\"), weight 109.3 kg (241 lb), SpO2 95 %.  General: Well developed, well nourished. No distress.  HEENT: Head is grossly normal.  Pulmonary: Unlabored respiratory effort. Lungs clear to auscultation, no wheezes, no rhonchi.  Cardiovascular: Regular rate and rhythm without murmur.   Back: Mild right flank tenderness to noted without overt CVA tenderness.  Trunk: Tenderness noted on the right sided ribs, mid axillary region, just under her bra strap extending along to the thoracic region.  No soft tissue swelling, erythema, ecchymosis, or rashes noted in the area of " pain.  Neurologic: Grossly nonfocal. No facial asymmetry noted.  Skin: Warm, dry, good turgor. No rashes in visible areas.   Psych: Normal mood. Alert and oriented x3. Judgment and insight is normal.    Assessment/Plan:  Discussed unknown etiology.  Will have patient try gabapentin and refill methocarbamol as it seems to be helping.  Patient to have a renal ultrasound performed at an outside location as it is unavailable at this location.  Follow-up with primary care for worsening or persistent symptoms.  1. Right flank pain  US-RENAL    methocarbamol (ROBAXIN-750) 750 MG Tab   2. Rib pain  gabapentin (NEURONTIN) 100 MG Cap    methocarbamol (ROBAXIN-750) 750 MG Tab

## 2019-03-18 ENCOUNTER — HOSPITAL ENCOUNTER (OUTPATIENT)
Dept: RADIOLOGY | Facility: MEDICAL CENTER | Age: 49
End: 2019-03-18
Attending: PHYSICIAN ASSISTANT
Payer: COMMERCIAL

## 2019-03-18 DIAGNOSIS — R10.9 RIGHT FLANK PAIN: ICD-10-CM

## 2019-03-18 PROCEDURE — 76775 US EXAM ABDO BACK WALL LIM: CPT

## 2019-04-01 ENCOUNTER — OFFICE VISIT (OUTPATIENT)
Dept: MEDICAL GROUP | Facility: PHYSICIAN GROUP | Age: 49
End: 2019-04-01
Payer: COMMERCIAL

## 2019-04-01 VITALS
WEIGHT: 245 LBS | HEART RATE: 86 BPM | OXYGEN SATURATION: 93 % | TEMPERATURE: 98 F | BODY MASS INDEX: 37.13 KG/M2 | DIASTOLIC BLOOD PRESSURE: 86 MMHG | HEIGHT: 68 IN | SYSTOLIC BLOOD PRESSURE: 108 MMHG | RESPIRATION RATE: 20 BRPM

## 2019-04-01 DIAGNOSIS — M54.9 ACUTE RIGHT-SIDED BACK PAIN, UNSPECIFIED BACK LOCATION: ICD-10-CM

## 2019-04-01 PROCEDURE — 99213 OFFICE O/P EST LOW 20 MIN: CPT | Performed by: NURSE PRACTITIONER

## 2019-04-01 ASSESSMENT — PATIENT HEALTH QUESTIONNAIRE - PHQ9
CLINICAL INTERPRETATION OF PHQ2 SCORE: 5
SUM OF ALL RESPONSES TO PHQ QUESTIONS 1-9: 20
5. POOR APPETITE OR OVEREATING: 0 - NOT AT ALL

## 2019-04-01 NOTE — PROGRESS NOTES
Chief Complaint   Patient presents with   • Follow-Up     Needs work clearance         This is a 48 y.o.female patient that presents today with the following:***    No problem-specific Assessment & Plan notes found for this encounter.      Office Visit on 03/05/2019   Component Date Value   • POC Color 03/05/2019 YELLOW    • POC Appearance 03/05/2019 CLEAR    • POC Leukocyte Esterase 03/05/2019 NEGATIVE    • POC Nitrites 03/05/2019 NEGATIVE    • POC Urobiligen 03/05/2019 0.2    • POC Protein 03/05/2019 NEGATIVE    • POC Urine PH 03/05/2019 6.0    • POC Blood 03/05/2019 MODERATE    • POC Specific Gravity 03/05/2019 1.025    • POC Ketones 03/05/2019 NEGATIVE    • POC Bilirubin 03/05/2019 NEGATIVE    • POC Glucose 03/05/2019 NEGATIVE          {[facility] COURSE:29108}    No past medical history on file.    Past Surgical History:   Procedure Laterality Date   • APPENDECTOMY     • CHOLECYSTECTOMY     • KNEE ARTHROSCOPY Left    • TUBAL COAGULATION LAPAROSCOPIC BILATERAL         No family history on file.    Contrast media with iodine [iodine]; Darvon [propoxyphene]; and Pcn [penicillins]    Current Outpatient Prescriptions Ordered in [facility]   Medication Sig Dispense Refill   • gabapentin (NEURONTIN) 100 MG Cap Take 1 Cap by mouth 3 times a day as needed (pain). 30 Cap 0   • methocarbamol (ROBAXIN-750) 750 MG Tab Take 1 Tab by mouth 4 times a day as needed (pain). 30 Tab 0   • Magnesium 250 MG Tab Take  by mouth.     • Doxylamine Succinate, Sleep, (SLEEP AID PO) Take  by mouth.     • meclizine (ANTIVERT) 25 MG Tab Take 1 Tab by mouth 3 times a day as needed. 30 Tab 0   • MethylPREDNISolone (MEDROL DOSEPAK) 4 MG Tablet Therapy Pack Take 1 Tab by mouth every day. 1 Kit 0   • ibuprofen (MOTRIN) 800 MG Tab Take 1 Tab by mouth every 8 hours as needed for Moderate Pain. 30 Tab 0   • fluticasone (FLONASE) 50 MCG/ACT nasal spray Spray 1 Spray in nose 2 times a day. 1 Bottle 0     No current Epic-ordered facility-administered medications on  "file.        {ROS:26066}    Physical exam:  /86 (BP Location: Right arm, Patient Position: Sitting, BP Cuff Size: Adult)   Pulse 86   Temp 36.7 °C (98 °F) (Temporal)   Resp 20   Ht 1.727 m (5' 8\")   Wt 111.1 kg (245 lb)   LMP 02/15/2019 (Approximate)   SpO2 93%   BMI 37.25 kg/m²   {PHYSICAL EXAM CHOICES:74032}    Medical decision making/discussion: ***    There are no diagnoses linked to this encounter.    No Follow-up on file.        Please note that this dictation was created using voice recognition software. I have made every reasonable attempt to correct obvious errors, but I expect that there are errors of grammar and possibly content that I did not discover before finalizing the note.        "

## 2019-04-01 NOTE — ASSESSMENT & PLAN NOTE
48-year-old female patient presents today to have paperwork filled out so that she can return to work.  She was seen in urgent care back in early March for right-sided back pain as well as flank pain.  At that time urgent care provider gave her a note for work and she needs another note so that she can return, this document was filled out for her, please see form scanned into media.  She already has an appointment scheduled with me at the end of April to establish care

## 2019-04-01 NOTE — PROGRESS NOTES
"Acute right-sided back pain  48-year-old female patient presents today to have paperwork filled out so that she can return to work.  She was seen in urgent care back in early March for right-sided back pain as well as flank pain.  At that time urgent care provider gave her a note for work and she needs another note so that she can return, this document was filled out for her, please see form scanned into media.  She already has an appointment scheduled with me at the end of April to establish care    /86 (BP Location: Right arm, Patient Position: Sitting, BP Cuff Size: Adult)   Pulse 86   Temp 36.7 °C (98 °F) (Temporal)   Resp 20   Ht 1.727 m (5' 8\")   Wt 111.1 kg (245 lb)   LMP 02/15/2019 (Approximate)   SpO2 93%   BMI 37.25 kg/m²     Patient was seen for 20 minutes face to face of which > 50% of appointment time was spent on counseling and coordination of care regarding the above.    "

## 2019-04-04 ENCOUNTER — PATIENT MESSAGE (OUTPATIENT)
Dept: URGENT CARE | Facility: PHYSICIAN GROUP | Age: 49
End: 2019-04-04

## 2019-04-07 ENCOUNTER — OFFICE VISIT (OUTPATIENT)
Dept: URGENT CARE | Facility: PHYSICIAN GROUP | Age: 49
End: 2019-04-07
Payer: COMMERCIAL

## 2019-04-07 VITALS
BODY MASS INDEX: 36.83 KG/M2 | TEMPERATURE: 97.9 F | OXYGEN SATURATION: 98 % | HEART RATE: 88 BPM | RESPIRATION RATE: 16 BRPM | DIASTOLIC BLOOD PRESSURE: 64 MMHG | HEIGHT: 68 IN | SYSTOLIC BLOOD PRESSURE: 108 MMHG | WEIGHT: 243 LBS

## 2019-04-07 DIAGNOSIS — R10.9 RIGHT FLANK PAIN: ICD-10-CM

## 2019-04-07 DIAGNOSIS — S39.012D ACUTE MYOFASCIAL STRAIN OF LUMBAR REGION, SUBSEQUENT ENCOUNTER: ICD-10-CM

## 2019-04-07 DIAGNOSIS — R07.81 RIB PAIN: ICD-10-CM

## 2019-04-07 PROCEDURE — 99214 OFFICE O/P EST MOD 30 MIN: CPT | Performed by: PHYSICIAN ASSISTANT

## 2019-04-07 RX ORDER — GABAPENTIN 100 MG/1
100 CAPSULE ORAL 3 TIMES DAILY PRN
Qty: 30 CAP | Refills: 0 | Status: SHIPPED | OUTPATIENT
Start: 2019-04-07 | End: 2019-04-29 | Stop reason: SDUPTHER

## 2019-04-07 RX ORDER — METHOCARBAMOL 750 MG/1
750 TABLET, FILM COATED ORAL 4 TIMES DAILY PRN
Qty: 30 TAB | Refills: 0 | Status: SHIPPED | OUTPATIENT
Start: 2019-04-07 | End: 2019-04-29 | Stop reason: SDUPTHER

## 2019-04-07 NOTE — PROGRESS NOTES
Chief Complaint   Patient presents with   • Back Pain     lower back pain/ not getting any better   • Medication Refill     for Robaxin and Gabapentin/        HISTORY OF PRESENT ILLNESS: Patient is a 48 y.o. female who presents today for the following:      Still having back pain  Initially seen 3/5/19; today is the fourth visit for the same symptoms  No significant improvement, denies worsening  Needing FMLA paperwork filled out  PCP filled out FMLA paperwork prior to today - return to work 4/10  Patient asking to return to work 4/15 as her pain is still significant  Declines PT stating she needs to get back to work  No changes in pain; no neurological deficits  OTC meds tried: none  Gabapentin and robaxin seem to be working     Patient Active Problem List    Diagnosis Date Noted   • Acute right-sided back pain 04/01/2019   • Adhesive capsulitis of left shoulder 05/10/2018   • Allergic rhinitis 05/10/2018   • Vertigo 05/10/2018   • Bee sting allergy 05/10/2018   • Obesity (BMI 35.0-39.9 without comorbidity) ([facility]) 01/24/2018       Allergies:Contrast media with iodine [iodine]; Darvon [propoxyphene]; and Pcn [penicillins]    Current Outpatient Prescriptions Ordered in [facility]   Medication Sig Dispense Refill   • methocarbamol (ROBAXIN-750) 750 MG Tab Take 1 Tab by mouth 4 times a day as needed (pain). 30 Tab 0   • gabapentin (NEURONTIN) 100 MG Cap Take 1 Cap by mouth 3 times a day as needed (pain). 30 Cap 0   • Magnesium 250 MG Tab Take  by mouth.     • Doxylamine Succinate, Sleep, (SLEEP AID PO) Take  by mouth.     • meclizine (ANTIVERT) 25 MG Tab Take 1 Tab by mouth 3 times a day as needed. 30 Tab 0     No current Epic-ordered facility-administered medications on file.        No past medical history on file.    Social History   Substance Use Topics   • Smoking status: Never Smoker   • Smokeless tobacco: Never Used   • Alcohol use No       No family status information on file.   No family history on file.    Review  "of Systems:   Constitutional ROS: No unexpected change in weight, No weakness, No fatigue  Eye ROS: No recent significant change in vision, No eye pain, redness, discharge  Ear ROS: No drainage, No tinnitus or vertigo, No recent change in hearing  Mouth/Throat ROS: No teeth or gum problems, No bleeding gums, No tongue complaints  Neck ROS: No swollen glands, No significant pain in neck  Pulmonary ROS: No chronic cough, sputum, or hemoptysis, No dyspnea on exertion, No wheezing  Cardiovascular ROS: No diaphoresis, No edema, No palpitations  Gastrointestinal ROS: No change in bowel habits, No significant change in appetite, No nausea, vomiting, diarrhea, or constipation  Musculoskeletal/Extremities ROS: Positive for back pain.  Hematologic/Lymphatic ROS: No chills, No night sweats, No weight loss  Skin/Integumentary ROS: No edema, No evidence of rash, No itching      Exam:  /64   Pulse 88   Temp 36.6 °C (97.9 °F) (Temporal)   Resp 16   Ht 1.727 m (5' 8\")   Wt 110.2 kg (243 lb)   SpO2 98%   General: Well developed, well nourished. No distress.  HEENT: Head is grossly normal.  Pulmonary: Unlabored respiratory effort.   Back: Mild tenderness noted in the paraspinous muscles of the lumbar spine.  Slight decreased range of motion in all planes due to pain.  Extremities: No motor or sensory deficit  Neurologic: Grossly nonfocal. No facial asymmetry noted.  Skin: Warm, dry, good turgor. No rashes in visible areas.   Psych: Normal mood. Alert and oriented x3. Judgment and insight is normal.    Assessment/Plan:  Declines physical therapy.  Will refill medications.  [facility] paperwork will be filled out and faxed tomorrow.  Follow-up with primary care for worsening or persistent symptoms.  1. Rib pain  methocarbamol (ROBAXIN-750) 750 MG Tab    gabapentin (NEURONTIN) 100 MG Cap   2. Right flank pain  methocarbamol (ROBAXIN-750) 750 MG Tab   3. Acute myofascial strain of lumbar region, subsequent encounter         "

## 2019-04-29 ENCOUNTER — OFFICE VISIT (OUTPATIENT)
Dept: MEDICAL GROUP | Facility: PHYSICIAN GROUP | Age: 49
End: 2019-04-29
Payer: COMMERCIAL

## 2019-04-29 VITALS
HEIGHT: 68 IN | RESPIRATION RATE: 16 BRPM | TEMPERATURE: 97.6 F | HEART RATE: 78 BPM | SYSTOLIC BLOOD PRESSURE: 126 MMHG | OXYGEN SATURATION: 97 % | WEIGHT: 241 LBS | DIASTOLIC BLOOD PRESSURE: 80 MMHG | BODY MASS INDEX: 36.53 KG/M2

## 2019-04-29 DIAGNOSIS — Z13.6 SCREENING FOR CARDIOVASCULAR CONDITION: ICD-10-CM

## 2019-04-29 DIAGNOSIS — R07.81 RIB PAIN: ICD-10-CM

## 2019-04-29 DIAGNOSIS — F51.01 PRIMARY INSOMNIA: ICD-10-CM

## 2019-04-29 DIAGNOSIS — E66.9 OBESITY (BMI 30-39.9): ICD-10-CM

## 2019-04-29 DIAGNOSIS — F33.1 MODERATE EPISODE OF RECURRENT MAJOR DEPRESSIVE DISORDER (HCC): ICD-10-CM

## 2019-04-29 DIAGNOSIS — N28.1 RENAL CYST: ICD-10-CM

## 2019-04-29 DIAGNOSIS — Z13.0 ENCOUNTER FOR SCREENING FOR HEMATOLOGIC DISORDER: ICD-10-CM

## 2019-04-29 DIAGNOSIS — R10.9 RIGHT FLANK PAIN: ICD-10-CM

## 2019-04-29 PROBLEM — F32.9 MAJOR DEPRESSIVE DISORDER: Status: ACTIVE | Noted: 2019-04-29

## 2019-04-29 PROCEDURE — 99214 OFFICE O/P EST MOD 30 MIN: CPT | Performed by: NURSE PRACTITIONER

## 2019-04-29 RX ORDER — TRAZODONE HYDROCHLORIDE 50 MG/1
50 TABLET ORAL
Qty: 60 TAB | Refills: 1 | Status: SHIPPED | OUTPATIENT
Start: 2019-04-29 | End: 2019-08-23 | Stop reason: SDUPTHER

## 2019-04-29 RX ORDER — DULOXETIN HYDROCHLORIDE 30 MG/1
30 CAPSULE, DELAYED RELEASE ORAL DAILY
Qty: 90 CAP | Refills: 1 | Status: SHIPPED | OUTPATIENT
Start: 2019-04-29

## 2019-04-29 RX ORDER — GABAPENTIN 100 MG/1
100 CAPSULE ORAL 3 TIMES DAILY PRN
Qty: 90 CAP | Refills: 1 | Status: SHIPPED | OUTPATIENT
Start: 2019-04-29 | End: 2019-08-23 | Stop reason: SDUPTHER

## 2019-04-29 RX ORDER — METHOCARBAMOL 750 MG/1
750 TABLET, FILM COATED ORAL 3 TIMES DAILY
Qty: 90 TAB | Refills: 1 | Status: SHIPPED | OUTPATIENT
Start: 2019-04-29 | End: 2019-08-23 | Stop reason: SDUPTHER

## 2019-04-29 RX ORDER — FLUOXETINE HYDROCHLORIDE 20 MG/1
20 CAPSULE ORAL DAILY
Qty: 90 CAP | Refills: 1 | Status: SHIPPED | OUTPATIENT
Start: 2019-04-29

## 2019-04-29 ASSESSMENT — PATIENT HEALTH QUESTIONNAIRE - PHQ9
CLINICAL INTERPRETATION OF PHQ2 SCORE: 4
5. POOR APPETITE OR OVEREATING: 2 - MORE THAN HALF THE DAYS
SUM OF ALL RESPONSES TO PHQ QUESTIONS 1-9: 15

## 2019-04-29 NOTE — PROGRESS NOTES
Chief Complaint   Patient presents with   • Establish Care   • Depression     patient was previosly on antidepression meds          This is a 48 y.o.female patient that presents today with the following: Depression    Major depressive disorder  Pt has history of depression, has not been treated in a while, has been on fluoxetine and duloxetine--this worked well for her. This has been called in. Denies SI/HI, hallucinations, racing thoughts or flights of ideas.    Obesity (BMI 30-39.9)  Chronic, uncontrolled.  Patient's weight is 241 pounds, BMI is 37.19.  She does understand the risks associated with her weight continues to work on this.    Renal cyst  Patient had incidental finding of renal cysts and is now followed by nephrology.  She has an upcoming appointment tomorrow.    Primary insomnia  Patient reports this to be chronic and mostly associated with her depression and anxiety.  We discussed the importance of sleep hygiene but she does agree to starting trazodone 50 mg 1 to 2 pills at bedtime.    Right flank pain  Patient was seen in urgent care for right flank and right rib pain, she was given prescription for gabapentin as well as Robaxin which seems to help her pain.  She is requesting a refill, this has been called in for her.      No visits with results within 1 Month(s) from this visit.   Latest known visit with results is:   Office Visit on 03/05/2019   Component Date Value   • POC Color 03/05/2019 YELLOW    • POC Appearance 03/05/2019 CLEAR    • POC Leukocyte Esterase 03/05/2019 NEGATIVE    • POC Nitrites 03/05/2019 NEGATIVE    • POC Urobiligen 03/05/2019 0.2    • POC Protein 03/05/2019 NEGATIVE    • POC Urine PH 03/05/2019 6.0    • POC Blood 03/05/2019 MODERATE    • POC Specific Gravity 03/05/2019 1.025    • POC Ketones 03/05/2019 NEGATIVE    • POC Bilirubin 03/05/2019 NEGATIVE    • POC Glucose 03/05/2019 NEGATIVE          clinical course has been stable    No past medical history on file.    Past  "Surgical History:   Procedure Laterality Date   • APPENDECTOMY     • CHOLECYSTECTOMY     • KNEE ARTHROSCOPY Left    • TUBAL COAGULATION LAPAROSCOPIC BILATERAL         No family history on file.    Contrast media with iodine [iodine]; Darvon [propoxyphene]; and Pcn [penicillins]    Current Outpatient Prescriptions Ordered in [facility]   Medication Sig Dispense Refill   • FLUoxetine (PROZAC) 20 MG Cap Take 1 Cap by mouth every day. 90 Cap 1   • DULoxetine (CYMBALTA) 30 MG Cap DR Particles Take 1 Cap by mouth every day. 90 Cap 1   • traZODone (DESYREL) 50 MG Tab Take 1 Tab by mouth every bedtime. TAKE 1-2 PILLS AT BEDTIME 60 Tab 1   • methocarbamol (ROBAXIN-750) 750 MG Tab Take 1 Tab by mouth 3 times a day. 90 Tab 1   • gabapentin (NEURONTIN) 100 MG Cap Take 1 Cap by mouth 3 times a day as needed (pain). 90 Cap 1   • Magnesium 250 MG Tab Take  by mouth.     • Doxylamine Succinate, Sleep, (SLEEP AID PO) Take  by mouth.     • meclizine (ANTIVERT) 25 MG Tab Take 1 Tab by mouth 3 times a day as needed. 30 Tab 0     No current Epic-ordered facility-administered medications on file.        Constitutional ROS: No unexpected change in weight, No weakness, No unexplained fevers, sweats, or chills  Pulmonary ROS: No chronic cough, sputum, or hemoptysis, No shortness of breath, No recent change in breathing  Cardiovascular ROS: No chest pain  Gastrointestinal ROS: No abdominal pain, No nausea, vomiting, diarrhea, or constipation  Musculoskeletal/Extremities ROS: Positive per HPI  Neurologic ROS: Normal development, No seizures, No weakness  Psychiatric ROS: Positive per HPI   ROS: Positive per HPI    Physical exam:  /80   Pulse 78   Temp 36.4 °C (97.6 °F) (Temporal)   Resp 16   Ht 1.715 m (5' 7.5\")   Wt 109.3 kg (241 lb)   SpO2 97%   BMI 37.19 kg/m²   General Appearance: Pleasant middle-aged female, alert, no distress, obese, well-groomed  Skin: Skin color, texture, turgor normal. No rashes or lesions.  Lungs: negative " findings: normal respiratory rate and rhythm, lungs clear to auscultation  Heart: negative. RRR without murmur, gallop, or rubs.  No ectopy.  Abdomen: Abdomen soft, non-tender. BS normal. No masses,  No organomegaly  Musculoskeletal: negative findings: no evidence of joint instability, no evidence of muscle atrophy, no deformities present, positive for tenderness to palpation to right flank, right lower ribs posteriorly  Neurologic: intact    Medical decision making/discussion: Patient is going to follow-up with me in 6 to 8 weeks with labs done before visit.  We are going to restart her on the Cymbalta and fluoxetine, she is to take this as prescribed.  Methocarbamol and gabapentin have been also refilled her right flank and posterior right lower rib.    Luisa was seen today for establish care and depression.    Diagnoses and all orders for this visit:    Obesity (BMI 30-39.9)  -     Patient identified as having weight management issue.  Appropriate orders and counseling given.    Moderate episode of recurrent major depressive disorder (HCC)  -     Patient has been identified as being depressed and appropriate orders and counseling have been given  -     FLUoxetine (PROZAC) 20 MG Cap; Take 1 Cap by mouth every day.  -     DULoxetine (CYMBALTA) 30 MG Cap DR Particles; Take 1 Cap by mouth every day.  -     REFERRAL TO BEHAVIORAL HEALTH    Renal cyst    Primary insomnia  -     traZODone (DESYREL) 50 MG Tab; Take 1 Tab by mouth every bedtime. TAKE 1-2 PILLS AT BEDTIME    Rib pain  -     methocarbamol (ROBAXIN-750) 750 MG Tab; Take 1 Tab by mouth 3 times a day.  -     gabapentin (NEURONTIN) 100 MG Cap; Take 1 Cap by mouth 3 times a day as needed (pain).    Right flank pain  -     methocarbamol (ROBAXIN-750) 750 MG Tab; Take 1 Tab by mouth 3 times a day.    Screening for cardiovascular condition  -     Comp Metabolic Panel; Future  -     Lipid Profile; Future    Encounter for screening for hematologic disorder  -      CBC WITH DIFFERENTIAL; Future        Return in about 6 weeks (around 6/10/2019) for Follow-up, Discuss Labs.        Please note that this dictation was created using voice recognition software. I have made every reasonable attempt to correct obvious errors, but I expect that there are errors of grammar and possibly content that I did not discover before finalizing the note.

## 2019-04-29 NOTE — ASSESSMENT & PLAN NOTE
Pt has history of depression, has not been treated in a while, has been on fluoxetine and duloxetine--this worked well for her. This has been called in. Denies SI/HI, hallucinations, racing thoughts or flights of ideas.

## 2019-04-30 ENCOUNTER — TELEMEDICINE ORIGINATING SITE VISIT (OUTPATIENT)
Dept: MEDICAL GROUP | Facility: PHYSICIAN GROUP | Age: 49
End: 2019-04-30
Payer: COMMERCIAL

## 2019-04-30 ENCOUNTER — TELEMEDICINE2 (OUTPATIENT)
Dept: NEPHROLOGY | Facility: MEDICAL CENTER | Age: 49
End: 2019-04-30
Payer: COMMERCIAL

## 2019-04-30 ENCOUNTER — APPOINTMENT (OUTPATIENT)
Dept: NEPHROLOGY | Facility: MEDICAL CENTER | Age: 49
End: 2019-04-30
Payer: COMMERCIAL

## 2019-04-30 VITALS
WEIGHT: 241 LBS | SYSTOLIC BLOOD PRESSURE: 110 MMHG | HEART RATE: 81 BPM | TEMPERATURE: 97.3 F | BODY MASS INDEX: 37.83 KG/M2 | HEIGHT: 67 IN | RESPIRATION RATE: 16 BRPM | OXYGEN SATURATION: 94 % | DIASTOLIC BLOOD PRESSURE: 68 MMHG

## 2019-04-30 DIAGNOSIS — G89.29 CHRONIC LOW BACK PAIN, UNSPECIFIED BACK PAIN LATERALITY, WITH SCIATICA PRESENCE UNSPECIFIED: ICD-10-CM

## 2019-04-30 DIAGNOSIS — E66.9 OBESITY (BMI 30-39.9): ICD-10-CM

## 2019-04-30 DIAGNOSIS — N28.1 RENAL CYST: ICD-10-CM

## 2019-04-30 DIAGNOSIS — N18.9 CHRONIC KIDNEY DISEASE, UNSPECIFIED CKD STAGE: ICD-10-CM

## 2019-04-30 DIAGNOSIS — M54.5 CHRONIC LOW BACK PAIN, UNSPECIFIED BACK PAIN LATERALITY, WITH SCIATICA PRESENCE UNSPECIFIED: ICD-10-CM

## 2019-04-30 PROBLEM — F51.01 PRIMARY INSOMNIA: Status: ACTIVE | Noted: 2019-04-30

## 2019-04-30 PROBLEM — R10.9 RIGHT FLANK PAIN: Status: ACTIVE | Noted: 2019-04-30

## 2019-04-30 PROBLEM — R07.81 RIB PAIN: Status: ACTIVE | Noted: 2019-04-30

## 2019-04-30 PROCEDURE — 99204 OFFICE O/P NEW MOD 45 MIN: CPT | Performed by: INTERNAL MEDICINE

## 2019-04-30 ASSESSMENT — ENCOUNTER SYMPTOMS
ABDOMINAL PAIN: 0
NAUSEA: 0
BACK PAIN: 1
SHORTNESS OF BREATH: 0
VOMITING: 0
COUGH: 0
NERVOUS/ANXIOUS: 1
FEVER: 0
INSOMNIA: 1
CHILLS: 0

## 2019-04-30 NOTE — ASSESSMENT & PLAN NOTE
Chronic, uncontrolled.  Patient's weight is 241 pounds, BMI is 37.19.  She does understand the risks associated with her weight continues to work on this.

## 2019-04-30 NOTE — PROGRESS NOTES
"Subjective:      Luisa Hernandez is a 48 y.o. female who presents with Chronic Kidney Disease            Patient has a history of chronic low back pain, recently had an abdominal ultrasound as well as a CT scan which showed left renal cyst, CT scan was without contrast however it was not suspicion for malignancy.  Patient has no recent use of NSAIDs or IV contrast exposure.  No recent hematuria.      Chronic Kidney Disease   This is a chronic problem. The current episode started more than 1 month ago. The problem occurs constantly. The problem has been unchanged. Pertinent negatives include no abdominal pain, chest pain, chills, coughing, fever, nausea, urinary symptoms or vomiting. Nothing aggravates the symptoms. She has tried nothing for the symptoms. The treatment provided no relief.       Review of Systems   Constitutional: Negative for chills, fever and malaise/fatigue.   Respiratory: Negative for cough and shortness of breath.    Cardiovascular: Negative for chest pain and leg swelling.   Gastrointestinal: Negative for abdominal pain, nausea and vomiting.   Genitourinary: Negative for dysuria, frequency and urgency.   Musculoskeletal: Positive for back pain.   Psychiatric/Behavioral: The patient is nervous/anxious and has insomnia.    All other systems reviewed and are negative.         Objective:     /68   Pulse 81   Temp 36.3 °C (97.3 °F)   Resp 16   Ht 1.702 m (5' 7\")   Wt 109.3 kg (241 lb)   SpO2 94%   BMI 37.75 kg/m²      Physical Exam   Constitutional: She is oriented to person, place, and time. She appears well-developed and well-nourished. No distress.   HENT:   Head: Normocephalic and atraumatic.   Right Ear: External ear normal.   Left Ear: External ear normal.   Nose: Nose normal.   Eyes: Conjunctivae are normal. Right eye exhibits no discharge. Left eye exhibits no discharge. No scleral icterus.   Neck: No JVD present. No tracheal deviation present. No thyromegaly present. "   Cardiovascular: Normal rate and regular rhythm.    No murmur heard.  Pulmonary/Chest: Effort normal and breath sounds normal. No respiratory distress. She has no wheezes.   Abdominal: Soft. She exhibits no distension. There is no tenderness. There is no guarding.   Musculoskeletal: She exhibits no edema, tenderness or deformity.   Lymphadenopathy:     She has no cervical adenopathy.   Neurological: She is alert and oriented to person, place, and time. No cranial nerve deficit.   Skin: Skin is warm and dry. She is not diaphoretic. No erythema.   Psychiatric: She has a normal mood and affect. Her behavior is normal. Thought content normal.   Nursing note and vitals reviewed.   exam was done by the help of the nurse at the remote facility.    History reviewed. No pertinent past medical history.  Social History     Social History   • Marital status: Single     Spouse name: N/A   • Number of children: N/A   • Years of education: N/A     Occupational History   • Not on file.     Social History Main Topics   • Smoking status: Never Smoker   • Smokeless tobacco: Never Used   • Alcohol use No   • Drug use: No   • Sexual activity: Not on file     Other Topics Concern   • Not on file     Social History Narrative   • No narrative on file     History reviewed. No pertinent family history.    I reviewed the recent CT scan report  from March 2019 , showed left renal cyst most likely simple cyst  I also reviewed the renal ultrasound images myself which showed left renal cyst.  No recent kidney function tests              Assessment/Plan:     1. Chronic kidney disease, unspecified CKD stage  Secondary to simple cysts  Patient has no uremic symptoms  Check kidney function test  Check microalbumin to creatinine ratio    2. Renal cyst  Most likely a simple cyst  Repeat US-RENAL; Future    3. Chronic low back pain, unspecified back pain laterality, with sciatica presence unspecified  Avoid nephrotoxins like NSAIDs    4. Obesity (BMI  30-39.9)  Weight management

## 2019-04-30 NOTE — ASSESSMENT & PLAN NOTE
Patient had incidental finding of renal cysts and is now followed by nephrology.  She has an upcoming appointment tomorrow.

## 2019-04-30 NOTE — ASSESSMENT & PLAN NOTE
Patient was seen in urgent care for right flank and right rib pain, she was given prescription for gabapentin as well as Robaxin which seems to help her pain.  She is requesting a refill, this has been called in for her.

## 2019-04-30 NOTE — ASSESSMENT & PLAN NOTE
Patient reports this to be chronic and mostly associated with her depression and anxiety.  We discussed the importance of sleep hygiene but she does agree to starting trazodone 50 mg 1 to 2 pills at bedtime.

## 2019-05-09 ENCOUNTER — TELEPHONE (OUTPATIENT)
Dept: MEDICAL GROUP | Facility: PHYSICIAN GROUP | Age: 49
End: 2019-05-09

## 2019-05-09 NOTE — TELEPHONE ENCOUNTER
Message left for pt to call back and let us know what is being sent to the office and where the fax is being sent from.     We have not received anything yet for this pt.

## 2019-08-23 DIAGNOSIS — R10.9 RIGHT FLANK PAIN: ICD-10-CM

## 2019-08-23 DIAGNOSIS — R07.81 RIB PAIN: ICD-10-CM

## 2019-08-23 DIAGNOSIS — F51.01 PRIMARY INSOMNIA: ICD-10-CM

## 2019-08-26 RX ORDER — GABAPENTIN 100 MG/1
CAPSULE ORAL
Qty: 270 CAP | Refills: 0 | Status: SHIPPED | OUTPATIENT
Start: 2019-08-26

## 2019-08-26 RX ORDER — TRAZODONE HYDROCHLORIDE 50 MG/1
TABLET ORAL
Qty: 90 TAB | Refills: 0 | Status: SHIPPED | OUTPATIENT
Start: 2019-08-26 | End: 2019-10-05 | Stop reason: SDUPTHER

## 2019-08-26 RX ORDER — METHOCARBAMOL 750 MG/1
TABLET, FILM COATED ORAL
Qty: 90 TAB | Refills: 0 | Status: SHIPPED | OUTPATIENT
Start: 2019-08-26 | End: 2019-10-07 | Stop reason: SDUPTHER

## 2019-08-26 NOTE — TELEPHONE ENCOUNTER
Was the patient seen in the last year in this department? Yes    Does patient have an active prescription for medications requested? No     Received Request Via: Pharmacy      Pt met protocol?: Yes    OV 4/19

## 2019-09-18 ENCOUNTER — OFFICE VISIT (OUTPATIENT)
Dept: URGENT CARE | Facility: PHYSICIAN GROUP | Age: 49
End: 2019-09-18
Payer: MEDICAID

## 2019-09-18 VITALS
DIASTOLIC BLOOD PRESSURE: 86 MMHG | OXYGEN SATURATION: 95 % | RESPIRATION RATE: 22 BRPM | SYSTOLIC BLOOD PRESSURE: 134 MMHG | TEMPERATURE: 98.2 F | HEART RATE: 88 BPM

## 2019-09-18 DIAGNOSIS — R10.31 RIGHT GROIN PAIN: ICD-10-CM

## 2019-09-18 DIAGNOSIS — R07.9 CHEST PAIN, UNSPECIFIED TYPE: ICD-10-CM

## 2019-09-18 DIAGNOSIS — R10.31 RIGHT LOWER QUADRANT ABDOMINAL PAIN: ICD-10-CM

## 2019-09-18 LAB
APPEARANCE UR: CLEAR
BILIRUB UR STRIP-MCNC: NORMAL MG/DL
COLOR UR AUTO: YELLOW
GLUCOSE UR STRIP.AUTO-MCNC: NORMAL MG/DL
INT CON NEG: NORMAL
INT CON POS: NORMAL
KETONES UR STRIP.AUTO-MCNC: NORMAL MG/DL
LEUKOCYTE ESTERASE UR QL STRIP.AUTO: NORMAL
NITRITE UR QL STRIP.AUTO: NORMAL
PH UR STRIP.AUTO: 6.5 [PH] (ref 5–8)
POC URINE PREGNANCY TEST: NORMAL
PROT UR QL STRIP: NORMAL MG/DL
RBC UR QL AUTO: NORMAL
SP GR UR STRIP.AUTO: 1.02
UROBILINOGEN UR STRIP-MCNC: 0.2 MG/DL

## 2019-09-18 PROCEDURE — 81002 URINALYSIS NONAUTO W/O SCOPE: CPT | Performed by: NURSE PRACTITIONER

## 2019-09-18 PROCEDURE — 99214 OFFICE O/P EST MOD 30 MIN: CPT | Mod: 25 | Performed by: NURSE PRACTITIONER

## 2019-09-18 PROCEDURE — 81025 URINE PREGNANCY TEST: CPT | Performed by: NURSE PRACTITIONER

## 2019-09-18 RX ORDER — HYDROCODONE BITARTRATE AND ACETAMINOPHEN 5; 325 MG/1; MG/1
1 TABLET ORAL EVERY 8 HOURS PRN
Qty: 15 TAB | Refills: 0 | Status: SHIPPED | OUTPATIENT
Start: 2019-09-18 | End: 2019-09-23

## 2019-09-18 ASSESSMENT — ENCOUNTER SYMPTOMS
ABDOMINAL PAIN: 1
RESPIRATORY NEGATIVE: 1
PALPITATIONS: 0
DIZZINESS: 1
SENSORY CHANGE: 0
CONSTITUTIONAL NEGATIVE: 1
NAUSEA: 0
CONSTIPATION: 0
VOMITING: 0
FOCAL WEAKNESS: 0
SHORTNESS OF BREATH: 0
DIARRHEA: 0
FEVER: 0

## 2019-09-18 NOTE — PROGRESS NOTES
Subjective:     Luisa Hernandez is a 48 y.o. female who presents for Leg Pain (Right leg pain since angiogram performed friday )       Abdominal Pain   This is a new problem. The problem has been gradually worsening. Pertinent negatives include no constipation, diarrhea, dysuria, fever, frequency, nausea or vomiting.     Patient presents with right lower abdominal and right groin pain.  She states she had an angiogram performed 5 days ago.  She states about 1 week ago she started to have some dizziness and some chest pain so she went to the hospital had a complete cardiac work-up including a stress test.  Patient states that the stress test and the angiogram came back unremarkable.  Patient reports the pain has been unchanged.  Prior therapy: Muscle relaxants with no relief in symptoms.  Denies fever, nausea, vomiting, bowel changes, or urinary symptoms.    PMH:  has no past medical history on file.    MEDS:   Current Outpatient Medications:   •  HYDROcodone-acetaminophen (NORCO) 5-325 MG Tab per tablet, Take 1 Tab by mouth every 8 hours as needed (severe pain) for up to 5 days., Disp: 15 Tab, Rfl: 0  •  traZODone (DESYREL) 50 MG Tab, TAKE 1 TO 2 TABLETS BY MOUTH ONCE DAILY AT BEDTIME, Disp: 90 Tab, Rfl: 0  •  methocarbamol (ROBAXIN) 750 MG Tab, TAKE 1 TABLET BY MOUTH THREE TIMES DAILY, Disp: 90 Tab, Rfl: 0  •  gabapentin (NEURONTIN) 100 MG Cap, TAKE 1 CAPSULE BY MOUTH THREE TIMES DAILY AS NEEDED FOR PAIN, Disp: 270 Cap, Rfl: 0  •  FLUoxetine (PROZAC) 20 MG Cap, Take 1 Cap by mouth every day., Disp: 90 Cap, Rfl: 1  •  DULoxetine (CYMBALTA) 30 MG Cap DR Particles, Take 1 Cap by mouth every day., Disp: 90 Cap, Rfl: 1  •  Magnesium 250 MG Tab, Take  by mouth., Disp: , Rfl:   •  Doxylamine Succinate, Sleep, (SLEEP AID PO), Take  by mouth., Disp: , Rfl:   •  meclizine (ANTIVERT) 25 MG Tab, Take 1 Tab by mouth 3 times a day as needed., Disp: 30 Tab, Rfl: 0    ALLERGIES:   Allergies   Allergen Reactions   • Contrast Media  With Iodine [Iodine]    • Darvon [Propoxyphene]    • Pcn [Penicillins]      SURGHX:   Past Surgical History:   Procedure Laterality Date   • APPENDECTOMY     • CHOLECYSTECTOMY     • KNEE ARTHROSCOPY Left    • TUBAL COAGULATION LAPAROSCOPIC BILATERAL       SOCHX:  reports that she has never smoked. She has never used smokeless tobacco. She reports that she does not drink alcohol or use drugs.     FH: Reviewed with patient, not pertinent to this visit.    Review of Systems   Constitutional: Negative.  Negative for fever and malaise/fatigue.   Respiratory: Negative.  Negative for shortness of breath.    Cardiovascular: Positive for chest pain. Negative for palpitations and leg swelling.   Gastrointestinal: Positive for abdominal pain. Negative for constipation, diarrhea, nausea and vomiting.   Genitourinary: Negative for dysuria, frequency and urgency.   Skin: Negative.    Neurological: Positive for dizziness. Negative for sensory change and focal weakness.   All other systems reviewed and are negative.    Objective:     /86   Pulse 88   Temp 36.8 °C (98.2 °F) (Temporal)   Resp (!) 22   SpO2 95%     Physical Exam   Constitutional: She is oriented to person, place, and time. She appears well-developed and well-nourished. She is cooperative.  Non-toxic appearance. No distress.   HENT:   Head: Normocephalic.   Right Ear: External ear normal.   Left Ear: External ear normal.   Nose: Nose normal.   Mouth/Throat: Mucous membranes are normal.   Eyes: Conjunctivae and EOM are normal.   Neck: Normal range of motion.   Cardiovascular: Normal rate, regular rhythm, normal heart sounds and normal pulses.   Pulmonary/Chest: Effort normal and breath sounds normal. No respiratory distress. She has no decreased breath sounds.   Abdominal: Soft. Bowel sounds are normal. She exhibits no distension. There is tenderness in the right lower quadrant. There is guarding. Hernia confirmed negative in the right inguinal area.    Musculoskeletal: Normal range of motion. She exhibits no edema or deformity.   Neurological: She is alert and oriented to person, place, and time. She has normal strength. No sensory deficit. Coordination normal.   Skin: Skin is warm and dry. Capillary refill takes less than 2 seconds. She is not diaphoretic. No pallor.   Psychiatric: She has a normal mood and affect. Her behavior is normal.   Vitals reviewed.    EKG: NSR 79 with no acute ST abnormalities    UA:     Ref Range & Units 3:43 PM   POC Color Negative yellow    POC Appearance Negative clear    POC Leukocyte Esterase Negative neg    POC Nitrites Negative neg    POC Urobiligen Negative (0.2) mg/dL 0.2    POC Protein Negative mg/dL neg    POC Urine PH 5.0 - 8.0 6.5    POC Blood Negative moderate    POC Specific Gravity <1.005 - >1.030 1.020    POC Ketones Negative mg/dL neg    POC Bilirubin Negative mg/dL neg    POC Glucose Negative mg/dL neg          Specimen Collected: 09/18/19  3:43 PM Last Resulted: 09/18/19  3:43 PM        POCT Pregnancy: negative       Assessment/Plan:     1. Right lower quadrant abdominal pain  - POCT Urinalysis  - POCT Pregnancy  - HYDROcodone-acetaminophen (NORCO) 5-325 MG Tab per tablet; Take 1 Tab by mouth every 8 hours as needed (severe pain) for up to 5 days.  Dispense: 15 Tab; Refill: 0  - CT-ABDOMEN-PELVIS W/O; Future    2. Right groin pain    3. Chest pain, unspecified type  - EKG - Clinic Performed    Other orders  - Consent for Opiate Prescription    EKG NSR 79 with no acute ST abnormalities.     UA with moderate blood. Urine pregnancy negative.    Various differentials of abdominal pain discussed with the patient.    CT abdomen pelvis without contrast ordered. Pt reports severe allergy to IV contrast.    Patient requesting medication for better pain relief.  Short-term Rx given for Norco.    [employer] and [place]  inquiries and Opioid Risk Tool show no increased risk of controlled substance abuse for this patient.  Patient counseled on risks and benefits of opioids. Written consent received for short-term prescription of controlled substance for adequate control of pain.    Patient reports that chest pain and dizziness have remained unchanged since onset about 1 week ago.  Patient had a full cardiac work-up, stress test, and angiogram which came back unremarkable according to the patient.    VS stable, afebrile, NAD.    Warning signs reviewed. Strict return/ER precautions advised.    Patient advised to: Return for 1) Symptoms don't improve or worsen, or go to ER, 2) Follow up with primary care in 7-10 days.    Differential diagnosis, natural history, supportive care, and indications for immediate follow-up discussed. All questions answered. Patient agrees with the plan of care.

## 2019-10-05 DIAGNOSIS — F51.01 PRIMARY INSOMNIA: ICD-10-CM

## 2019-10-07 DIAGNOSIS — R10.9 RIGHT FLANK PAIN: ICD-10-CM

## 2019-10-07 DIAGNOSIS — R07.81 RIB PAIN: ICD-10-CM

## 2019-10-07 RX ORDER — TRAZODONE HYDROCHLORIDE 50 MG/1
TABLET ORAL
Qty: 60 TAB | Refills: 1 | Status: SHIPPED | OUTPATIENT
Start: 2019-10-07

## 2019-10-07 NOTE — TELEPHONE ENCOUNTER
Was the patient seen in the last year in this department? Yes    Does patient have an active prescription for medications requested? No     Received Request Via: Pharmacy      Pt met protocol?: Yes   Pt last ov 4/2019

## 2019-10-09 NOTE — TELEPHONE ENCOUNTER
Was the patient seen in the last year in this department? Yes    Does patient have an active prescription for medications requested? No     Received Request Via: Pharmacy      Pt met protocol?: Yes, last ov 4/19

## 2019-10-10 RX ORDER — METHOCARBAMOL 750 MG/1
TABLET, FILM COATED ORAL
Qty: 90 TAB | Refills: 0 | Status: SHIPPED | OUTPATIENT
Start: 2019-10-10

## 2019-10-10 NOTE — TELEPHONE ENCOUNTER
1 month supply refilled. Please advise pt to do labs and make appt for follow-up and additional fills.   36.9

## 2019-10-10 NOTE — TELEPHONE ENCOUNTER
Phone Number Called: [phone] (home)       Call outcome: telephone line disconnected    Message: unable to lvm.
